# Patient Record
Sex: FEMALE | Race: WHITE | NOT HISPANIC OR LATINO | Employment: FULL TIME | URBAN - METROPOLITAN AREA
[De-identification: names, ages, dates, MRNs, and addresses within clinical notes are randomized per-mention and may not be internally consistent; named-entity substitution may affect disease eponyms.]

---

## 2017-02-16 ENCOUNTER — LAB REQUISITION (OUTPATIENT)
Dept: LAB | Facility: HOSPITAL | Age: 42
End: 2017-02-16

## 2017-02-16 DIAGNOSIS — K31.84 GASTROPARESIS: ICD-10-CM

## 2017-02-16 PROCEDURE — 88305 TISSUE EXAM BY PATHOLOGIST: CPT | Performed by: INTERNAL MEDICINE

## 2017-02-17 LAB
CYTO UR: NORMAL
LAB AP CASE REPORT: NORMAL
LAB AP CLINICAL INFORMATION: NORMAL
Lab: NORMAL
PATH REPORT.FINAL DX SPEC: NORMAL
PATH REPORT.GROSS SPEC: NORMAL

## 2018-12-19 ENCOUNTER — TELEPHONE (OUTPATIENT)
Dept: GASTROENTEROLOGY | Facility: CLINIC | Age: 43
End: 2018-12-19

## 2018-12-19 DIAGNOSIS — K31.84 GASTROPARESIS: Primary | ICD-10-CM

## 2018-12-19 RX ORDER — METOCLOPRAMIDE 10 MG/1
TABLET ORAL
Qty: 90 TABLET | Refills: 0 | Status: SHIPPED | OUTPATIENT
Start: 2018-12-19 | End: 2019-02-08 | Stop reason: SDUPTHER

## 2018-12-19 NOTE — TELEPHONE ENCOUNTER
Patient called because she is having terrible headaches with the domperidone. Per  sent in a month supply of Reglan and scheduled patient for a follow up

## 2019-02-08 DIAGNOSIS — K31.84 GASTROPARESIS: ICD-10-CM

## 2019-02-08 RX ORDER — METOCLOPRAMIDE 10 MG/1
TABLET ORAL
Qty: 33 TABLET | Refills: 0 | OUTPATIENT
Start: 2019-02-08 | End: 2020-02-05

## 2019-02-28 DIAGNOSIS — K31.84 GASTROPARESIS: ICD-10-CM

## 2019-03-01 ENCOUNTER — TELEPHONE (OUTPATIENT)
Dept: GASTROENTEROLOGY | Facility: CLINIC | Age: 44
End: 2019-03-01

## 2019-03-01 NOTE — TELEPHONE ENCOUNTER
"When the Pt and this nurse spoke on the phone she said she told tonia not to send the medication refill to us. I asked if she needed it. The Pt told me she really needed to find a different DrWaleska Because it is hard to get here when she lives in [place] and works in [place]. I told her I understand that because I see that you have \"no showed\" for your last two appointments and canceled 2 more. This nurse made her a visit on 3/26/19. This nurse informed her about [facility]'s cancellation and No show policy.    The Pt. Then told me that she didn't know what she would do since she needed the medication and would run out on Tuesday. I looked at the notes and on 12/19/18 and it said she could change from donparidon to Reglan for only one month and she needed to have a follow up visit. However she didn't keep any of them. I told her Dr. Ervin could refill the medication after she has an office due to the nonreversible side effects. Pt. Said she was recording our phone call and that I was telling her she would have to go the to ER if she wanted this medication. This nurse told her that she should have called us before she ran out of medication to avoid this situation. I asked her when she could come in. The Pt. Said she guesses she would have to go to the ER instead because she couldn't imagine what would happen to her if she didn't have this medication. This nurse did not want her to go to the ER so I super overbooked Dr. Ervin for 3/4/19 at 1:15pm.   "

## 2019-03-04 RX ORDER — METOCLOPRAMIDE 10 MG/1
TABLET ORAL
Qty: 33 TABLET | Refills: 0 | OUTPATIENT
Start: 2019-03-04

## 2019-10-24 ENCOUNTER — HOSPITAL ENCOUNTER (EMERGENCY)
Facility: HOSPITAL | Age: 44
Discharge: HOME | End: 2019-10-24
Attending: STUDENT IN AN ORGANIZED HEALTH CARE EDUCATION/TRAINING PROGRAM | Admitting: STUDENT IN AN ORGANIZED HEALTH CARE EDUCATION/TRAINING PROGRAM

## 2019-10-24 VITALS
TEMPERATURE: 98.5 F | WEIGHT: 213.8 LBS | BODY MASS INDEX: 32.4 KG/M2 | OXYGEN SATURATION: 98 % | HEIGHT: 68 IN | DIASTOLIC BLOOD PRESSURE: 95 MMHG | SYSTOLIC BLOOD PRESSURE: 130 MMHG | HEART RATE: 61 BPM | RESPIRATION RATE: 16 BRPM

## 2019-10-24 DIAGNOSIS — L50.8 URTICARIA, ACUTE: Primary | ICD-10-CM

## 2019-10-24 PROCEDURE — 25010000002 DIPHENHYDRAMINE PER 50 MG: Performed by: PHYSICIAN ASSISTANT

## 2019-10-24 PROCEDURE — 96374 THER/PROPH/DIAG INJ IV PUSH: CPT

## 2019-10-24 PROCEDURE — 96375 TX/PRO/DX INJ NEW DRUG ADDON: CPT

## 2019-10-24 PROCEDURE — 99282 EMERGENCY DEPT VISIT SF MDM: CPT

## 2019-10-24 PROCEDURE — 25010000002 METHYLPREDNISOLONE PER 40 MG: Performed by: PHYSICIAN ASSISTANT

## 2019-10-24 RX ORDER — FAMOTIDINE 20 MG/1
20 TABLET, FILM COATED ORAL NIGHTLY
Qty: 5 TABLET | Refills: 0 | OUTPATIENT
Start: 2019-10-24 | End: 2020-02-05

## 2019-10-24 RX ORDER — METHYLPREDNISOLONE SODIUM SUCCINATE 40 MG/ML
80 INJECTION, POWDER, LYOPHILIZED, FOR SOLUTION INTRAMUSCULAR; INTRAVENOUS ONCE
Status: COMPLETED | OUTPATIENT
Start: 2019-10-24 | End: 2019-10-24

## 2019-10-24 RX ORDER — HYDROXYZINE PAMOATE 25 MG/1
25 CAPSULE ORAL 3 TIMES DAILY PRN
Qty: 21 CAPSULE | Refills: 0 | OUTPATIENT
Start: 2019-10-24 | End: 2020-02-05

## 2019-10-24 RX ORDER — DIPHENHYDRAMINE HYDROCHLORIDE 50 MG/ML
25 INJECTION INTRAMUSCULAR; INTRAVENOUS ONCE
Status: COMPLETED | OUTPATIENT
Start: 2019-10-24 | End: 2019-10-24

## 2019-10-24 RX ORDER — PREDNISONE 20 MG/1
20 TABLET ORAL 2 TIMES DAILY
Qty: 8 TABLET | Refills: 0 | OUTPATIENT
Start: 2019-10-24 | End: 2020-02-05

## 2019-10-24 RX ORDER — SODIUM CHLORIDE 0.9 % (FLUSH) 0.9 %
10 SYRINGE (ML) INJECTION AS NEEDED
Status: DISCONTINUED | OUTPATIENT
Start: 2019-10-24 | End: 2019-10-24 | Stop reason: HOSPADM

## 2019-10-24 RX ORDER — FAMOTIDINE 10 MG/ML
20 INJECTION, SOLUTION INTRAVENOUS ONCE
Status: COMPLETED | OUTPATIENT
Start: 2019-10-24 | End: 2019-10-24

## 2019-10-24 RX ADMIN — FAMOTIDINE 20 MG: 10 INJECTION, SOLUTION INTRAVENOUS at 13:01

## 2019-10-24 RX ADMIN — SODIUM CHLORIDE 1000 ML: 9 INJECTION, SOLUTION INTRAVENOUS at 13:06

## 2019-10-24 RX ADMIN — METHYLPREDNISOLONE SODIUM SUCCINATE 80 MG: 40 INJECTION, POWDER, FOR SOLUTION INTRAMUSCULAR; INTRAVENOUS at 13:04

## 2019-10-24 RX ADMIN — DIPHENHYDRAMINE HYDROCHLORIDE 25 MG: 50 INJECTION, SOLUTION INTRAMUSCULAR; INTRAVENOUS at 12:58

## 2019-10-24 NOTE — ED PROVIDER NOTES
Subjective   Is here with complaint of some pruritic hives to her torso some to her thighs noted yesterday evening with some swelling to her lips associated no trouble swallowing no chest pain or shortness of air no fevers chills no nausea patient currently taking lisinopril and has taken Advil as well recently presents here for further evaluation        History provided by:  Patient      Review of Systems   Constitutional: Negative.  Negative for chills and fever.   HENT: Negative for drooling, sore throat and trouble swallowing.    Eyes: Negative.    Respiratory: Negative.    Cardiovascular: Negative.  Negative for leg swelling.   Gastrointestinal: Negative.    Genitourinary: Negative.    Musculoskeletal: Negative.    Skin: Positive for rash.   Neurological: Negative.    Psychiatric/Behavioral: The patient is nervous/anxious.    All other systems reviewed and are negative.      Past Medical History:   Diagnosis Date   • Anemia    • Gastroparesis    • GERD (gastroesophageal reflux disease)    • Hypertension        No Known Allergies    Past Surgical History:   Procedure Laterality Date   •  SECTION         History reviewed. No pertinent family history.    Social History     Socioeconomic History   • Marital status: Single     Spouse name: Not on file   • Number of children: Not on file   • Years of education: Not on file   • Highest education level: Not on file   Tobacco Use   • Smoking status: Never Smoker   • Smokeless tobacco: Never Used   Substance and Sexual Activity   • Alcohol use: Yes     Comment: Occ.   • Drug use: No   • Sexual activity: Defer           Objective   Physical Exam   Constitutional: She is oriented to person, place, and time. She appears well-developed and well-nourished.   Afebrile nontoxic no acute distress   HENT:   Head: Normocephalic.   Mouth/Throat: Oropharynx is clear and moist. No oropharyngeal exudate.   Posterior pharynx is clear uvula is midline no airway compromise  patient does have some slight edema to the lower lips   Eyes: Conjunctivae and EOM are normal. Pupils are equal, round, and reactive to light.   Neck: Normal range of motion. Neck supple. No tracheal deviation present.   Cardiovascular: Normal rate, regular rhythm and intact distal pulses.   Pulmonary/Chest: Effort normal and breath sounds normal.   Musculoskeletal: Normal range of motion. She exhibits no edema.   Lymphadenopathy:     She has no cervical adenopathy.   Neurological: She is alert and oriented to person, place, and time. No cranial nerve deficit or sensory deficit. She exhibits normal muscle tone. Coordination normal.   Skin: Skin is warm and dry. Capillary refill takes less than 2 seconds. Rash noted. There is erythema.   Some slight erythematous wheals.. Upper arms thighs   Psychiatric: Her behavior is normal. Judgment and thought content normal.   Somewhat anxious   Nursing note and vitals reviewed.      Procedures           ED Course  ED Course as of Oct 24 1415   Thu Oct 24, 2019   1232 Patient case and management reviewed with Dr. Cross  [SC]   1314 Patient resting no acute distress  [SC]   1409 She resting comfortably states she feels better she has dramatically decreased rash, decreased edema to her lower lip have discussed with her not taking any further lisinopril or ibuprofen medication  Follow-up with PCP... Return to the ER for any sudden changes worsening symptom concerns.. Agreeable with plan we will also continue prescribing some prednisone Vistaril and Pepcid for the next 2 days  [SC]      ED Course User Index  [SC] Joel Hilliard, DEANNA                  MDM  Number of Diagnoses or Management Options     Amount and/or Complexity of Data Reviewed  Review and summarize past medical records: yes  Discuss the patient with other providers: yes    Risk of Complications, Morbidity, and/or Mortality  Presenting problems: moderate  Diagnostic procedures: low  Management options:  low        Final diagnoses:   Urticaria, acute              Joel Hilliard PA-C  10/24/19 0170

## 2020-01-24 ENCOUNTER — TRANSCRIBE ORDERS (OUTPATIENT)
Dept: ADMINISTRATIVE | Facility: HOSPITAL | Age: 45
End: 2020-01-24

## 2020-01-24 DIAGNOSIS — Z12.31 BREAST CANCER SCREENING BY MAMMOGRAM: Primary | ICD-10-CM

## 2020-02-12 ENCOUNTER — TELEPHONE (OUTPATIENT)
Dept: URGENT CARE | Facility: CLINIC | Age: 45
End: 2020-02-12

## 2020-02-12 NOTE — TELEPHONE ENCOUNTER
Patient called and requesting diflucan since she has seemed to developed a yeast infection with her Tamiflu.  Thanks

## 2021-12-19 ENCOUNTER — TELEPHONE (OUTPATIENT)
Dept: URGENT CARE | Facility: CLINIC | Age: 46
End: 2021-12-19

## 2021-12-19 DIAGNOSIS — J20.9 ACUTE BRONCHITIS, UNSPECIFIED ORGANISM: ICD-10-CM

## 2021-12-19 RX ORDER — ALBUTEROL SULFATE 90 UG/1
2 AEROSOL, METERED RESPIRATORY (INHALATION) EVERY 4 HOURS PRN
Qty: 8 G | Refills: 0 | OUTPATIENT
Start: 2021-12-19 | End: 2021-12-31

## 2021-12-31 PROCEDURE — U0004 COV-19 TEST NON-CDC HGH THRU: HCPCS | Performed by: FAMILY MEDICINE

## 2022-03-30 ENCOUNTER — PRE-ADMISSION TESTING (OUTPATIENT)
Dept: PREADMISSION TESTING | Facility: HOSPITAL | Age: 47
End: 2022-03-30

## 2022-03-30 ENCOUNTER — PREP FOR SURGERY (OUTPATIENT)
Dept: OTHER | Facility: HOSPITAL | Age: 47
End: 2022-03-30

## 2022-03-30 ENCOUNTER — OFFICE VISIT (OUTPATIENT)
Dept: OBSTETRICS AND GYNECOLOGY | Facility: CLINIC | Age: 47
End: 2022-03-30

## 2022-03-30 VITALS
WEIGHT: 236 LBS | SYSTOLIC BLOOD PRESSURE: 128 MMHG | BODY MASS INDEX: 35.77 KG/M2 | HEIGHT: 68 IN | DIASTOLIC BLOOD PRESSURE: 80 MMHG

## 2022-03-30 DIAGNOSIS — N92.1 MENORRHAGIA WITH IRREGULAR CYCLE: ICD-10-CM

## 2022-03-30 DIAGNOSIS — R93.89 ENDOMETRIAL THICKENING ON ULTRASOUND: ICD-10-CM

## 2022-03-30 DIAGNOSIS — N93.9 ABNORMAL UTERINE BLEEDING (AUB): ICD-10-CM

## 2022-03-30 DIAGNOSIS — N93.9 ABNORMAL UTERINE BLEEDING (AUB): Primary | ICD-10-CM

## 2022-03-30 DIAGNOSIS — N85.2 BULKY OR ENLARGED UTERUS: ICD-10-CM

## 2022-03-30 DIAGNOSIS — D50.0 CHRONIC BLOOD LOSS ANEMIA: ICD-10-CM

## 2022-03-30 DIAGNOSIS — Z12.4 SCREENING FOR MALIGNANT NEOPLASM OF CERVIX: ICD-10-CM

## 2022-03-30 DIAGNOSIS — R93.89 THICKENED ENDOMETRIUM: ICD-10-CM

## 2022-03-30 LAB
ABO GROUP BLD: NORMAL
ALBUMIN SERPL-MCNC: 4.5 G/DL (ref 3.5–5.2)
ALBUMIN/GLOB SERPL: 1.7 G/DL
ALP SERPL-CCNC: 61 U/L (ref 39–117)
ALT SERPL W P-5'-P-CCNC: 44 U/L (ref 1–33)
ANION GAP SERPL CALCULATED.3IONS-SCNC: 12.8 MMOL/L (ref 5–15)
ANISOCYTOSIS BLD QL: NORMAL
AST SERPL-CCNC: 29 U/L (ref 1–32)
B-HCG UR QL: NEGATIVE
BASOPHILS # BLD AUTO: 0.04 10*3/MM3 (ref 0–0.2)
BASOPHILS NFR BLD AUTO: 0.3 % (ref 0–1.5)
BILIRUB SERPL-MCNC: 0.2 MG/DL (ref 0–1.2)
BILIRUB UR QL STRIP: NEGATIVE
BUN SERPL-MCNC: 11 MG/DL (ref 6–20)
BUN/CREAT SERPL: 15.3 (ref 7–25)
CALCIUM SPEC-SCNC: 9.1 MG/DL (ref 8.6–10.5)
CHLORIDE SERPL-SCNC: 99 MMOL/L (ref 98–107)
CLARITY UR: CLEAR
CO2 SERPL-SCNC: 23.2 MMOL/L (ref 22–29)
COLOR UR: YELLOW
CREAT SERPL-MCNC: 0.72 MG/DL (ref 0.57–1)
DEPRECATED RDW RBC AUTO: 46 FL (ref 37–54)
EGFRCR SERPLBLD CKD-EPI 2021: 103.9 ML/MIN/1.73
EOSINOPHIL # BLD AUTO: 0.01 10*3/MM3 (ref 0–0.4)
EOSINOPHIL NFR BLD AUTO: 0.1 % (ref 0.3–6.2)
ERYTHROCYTE [DISTWIDTH] IN BLOOD BY AUTOMATED COUNT: 16.9 % (ref 12.3–15.4)
GLOBULIN UR ELPH-MCNC: 2.6 GM/DL
GLUCOSE SERPL-MCNC: 131 MG/DL (ref 65–99)
GLUCOSE UR STRIP-MCNC: NEGATIVE MG/DL
HCT VFR BLD AUTO: 31.7 % (ref 34–46.6)
HGB BLD-MCNC: 9.4 G/DL (ref 12–15.9)
HGB UR QL STRIP.AUTO: NEGATIVE
HYPOCHROMIA BLD QL: NORMAL
IMM GRANULOCYTES # BLD AUTO: 0.11 10*3/MM3 (ref 0–0.05)
IMM GRANULOCYTES NFR BLD AUTO: 0.9 % (ref 0–0.5)
KETONES UR QL STRIP: NEGATIVE
LEUKOCYTE ESTERASE UR QL STRIP.AUTO: NEGATIVE
LYMPHOCYTES # BLD AUTO: 1.13 10*3/MM3 (ref 0.7–3.1)
LYMPHOCYTES NFR BLD AUTO: 9.5 % (ref 19.6–45.3)
MCH RBC QN AUTO: 22.7 PG (ref 26.6–33)
MCHC RBC AUTO-ENTMCNC: 29.7 G/DL (ref 31.5–35.7)
MCV RBC AUTO: 76.4 FL (ref 79–97)
MONOCYTES # BLD AUTO: 0.28 10*3/MM3 (ref 0.1–0.9)
MONOCYTES NFR BLD AUTO: 2.3 % (ref 5–12)
NEUTROPHILS NFR BLD AUTO: 10.35 10*3/MM3 (ref 1.7–7)
NEUTROPHILS NFR BLD AUTO: 86.9 % (ref 42.7–76)
NITRITE UR QL STRIP: NEGATIVE
NRBC BLD AUTO-RTO: 0 /100 WBC (ref 0–0.2)
PH UR STRIP.AUTO: 7.5 [PH] (ref 5–8)
PLATELET # BLD AUTO: 359 10*3/MM3 (ref 140–450)
PMV BLD AUTO: 9.3 FL (ref 6–12)
POTASSIUM SERPL-SCNC: 4.2 MMOL/L (ref 3.5–5.2)
PROT SERPL-MCNC: 7.1 G/DL (ref 6–8.5)
PROT UR QL STRIP: NEGATIVE
RBC # BLD AUTO: 4.15 10*6/MM3 (ref 3.77–5.28)
RH BLD: POSITIVE
SMALL PLATELETS BLD QL SMEAR: ADEQUATE
SODIUM SERPL-SCNC: 135 MMOL/L (ref 136–145)
SP GR UR STRIP: 1.01 (ref 1–1.03)
UROBILINOGEN UR QL STRIP: NORMAL
WBC MORPH BLD: NORMAL
WBC NRBC COR # BLD: 11.92 10*3/MM3 (ref 3.4–10.8)

## 2022-03-30 PROCEDURE — 85007 BL SMEAR W/DIFF WBC COUNT: CPT

## 2022-03-30 PROCEDURE — 93005 ELECTROCARDIOGRAM TRACING: CPT

## 2022-03-30 PROCEDURE — 86901 BLOOD TYPING SEROLOGIC RH(D): CPT

## 2022-03-30 PROCEDURE — 81025 URINE PREGNANCY TEST: CPT

## 2022-03-30 PROCEDURE — 81003 URINALYSIS AUTO W/O SCOPE: CPT

## 2022-03-30 PROCEDURE — 85025 COMPLETE CBC W/AUTO DIFF WBC: CPT

## 2022-03-30 PROCEDURE — 80053 COMPREHEN METABOLIC PANEL: CPT

## 2022-03-30 PROCEDURE — 93010 ELECTROCARDIOGRAM REPORT: CPT | Performed by: INTERNAL MEDICINE

## 2022-03-30 PROCEDURE — 36415 COLL VENOUS BLD VENIPUNCTURE: CPT

## 2022-03-30 PROCEDURE — 86900 BLOOD TYPING SEROLOGIC ABO: CPT

## 2022-03-30 PROCEDURE — 99204 OFFICE O/P NEW MOD 45 MIN: CPT | Performed by: OBSTETRICS & GYNECOLOGY

## 2022-03-30 RX ORDER — SODIUM CHLORIDE 0.9 % (FLUSH) 0.9 %
3 SYRINGE (ML) INJECTION EVERY 12 HOURS SCHEDULED
Status: CANCELLED | OUTPATIENT
Start: 2022-03-30

## 2022-03-30 RX ORDER — SODIUM CHLORIDE 0.9 % (FLUSH) 0.9 %
10 SYRINGE (ML) INJECTION AS NEEDED
Status: CANCELLED | OUTPATIENT
Start: 2022-03-30

## 2022-03-31 LAB
QT INTERVAL: 406 MS
QTC INTERVAL: 456 MS

## 2022-04-01 ENCOUNTER — ANESTHESIA EVENT (OUTPATIENT)
Dept: PERIOP | Facility: HOSPITAL | Age: 47
End: 2022-04-01

## 2022-04-01 ENCOUNTER — ANESTHESIA (OUTPATIENT)
Dept: PERIOP | Facility: HOSPITAL | Age: 47
End: 2022-04-01

## 2022-04-01 ENCOUNTER — HOSPITAL ENCOUNTER (OUTPATIENT)
Facility: HOSPITAL | Age: 47
Setting detail: HOSPITAL OUTPATIENT SURGERY
Discharge: HOME | End: 2022-04-01
Attending: OBSTETRICS & GYNECOLOGY | Admitting: OBSTETRICS & GYNECOLOGY

## 2022-04-01 VITALS
HEART RATE: 66 BPM | DIASTOLIC BLOOD PRESSURE: 69 MMHG | RESPIRATION RATE: 18 BRPM | TEMPERATURE: 97.8 F | OXYGEN SATURATION: 98 % | SYSTOLIC BLOOD PRESSURE: 135 MMHG

## 2022-04-01 DIAGNOSIS — N85.2 BULKY OR ENLARGED UTERUS: ICD-10-CM

## 2022-04-01 DIAGNOSIS — N92.1 MENORRHAGIA WITH IRREGULAR CYCLE: ICD-10-CM

## 2022-04-01 DIAGNOSIS — N93.9 ABNORMAL UTERINE BLEEDING (AUB): ICD-10-CM

## 2022-04-01 DIAGNOSIS — D50.0 CHRONIC BLOOD LOSS ANEMIA: ICD-10-CM

## 2022-04-01 DIAGNOSIS — R93.89 ENDOMETRIAL THICKENING ON ULTRASOUND: ICD-10-CM

## 2022-04-01 DIAGNOSIS — Z98.890 S/P D&C (STATUS POST DILATION AND CURETTAGE): Primary | ICD-10-CM

## 2022-04-01 DIAGNOSIS — Z97.5 IUD (INTRAUTERINE DEVICE) IN PLACE: ICD-10-CM

## 2022-04-01 LAB
ABO GROUP BLD: NORMAL
BLD GP AB SCN SERPL QL: NEGATIVE
RH BLD: POSITIVE
T&S EXPIRATION DATE: NORMAL

## 2022-04-01 PROCEDURE — S0260 H&P FOR SURGERY: HCPCS | Performed by: OBSTETRICS & GYNECOLOGY

## 2022-04-01 PROCEDURE — 58300 INSERT INTRAUTERINE DEVICE: CPT | Performed by: OBSTETRICS & GYNECOLOGY

## 2022-04-01 PROCEDURE — 86900 BLOOD TYPING SEROLOGIC ABO: CPT | Performed by: OBSTETRICS & GYNECOLOGY

## 2022-04-01 PROCEDURE — 86901 BLOOD TYPING SEROLOGIC RH(D): CPT | Performed by: OBSTETRICS & GYNECOLOGY

## 2022-04-01 PROCEDURE — 0 MEPERIDINE PER 100 MG: Performed by: NURSE ANESTHETIST, CERTIFIED REGISTERED

## 2022-04-01 PROCEDURE — C1889 IMPLANT/INSERT DEVICE, NOC: HCPCS | Performed by: OBSTETRICS & GYNECOLOGY

## 2022-04-01 PROCEDURE — 58558 HYSTEROSCOPY BIOPSY: CPT | Performed by: OBSTETRICS & GYNECOLOGY

## 2022-04-01 PROCEDURE — 86850 RBC ANTIBODY SCREEN: CPT | Performed by: OBSTETRICS & GYNECOLOGY

## 2022-04-01 PROCEDURE — 25010000002 ONDANSETRON PER 1 MG: Performed by: NURSE ANESTHETIST, CERTIFIED REGISTERED

## 2022-04-01 PROCEDURE — 25010000002 PROPOFOL 10 MG/ML EMULSION: Performed by: NURSE ANESTHETIST, CERTIFIED REGISTERED

## 2022-04-01 PROCEDURE — 25010000002 MIDAZOLAM PER 1 MG: Performed by: NURSE ANESTHETIST, CERTIFIED REGISTERED

## 2022-04-01 PROCEDURE — 25010000002 FENTANYL CITRATE (PF) 50 MCG/ML SOLUTION: Performed by: NURSE ANESTHETIST, CERTIFIED REGISTERED

## 2022-04-01 DEVICE — IUD LEVONORGESTREL MIRENA 52MG: Type: IMPLANTABLE DEVICE | Site: UTERUS | Status: FUNCTIONAL

## 2022-04-01 RX ORDER — PROPOFOL 10 MG/ML
VIAL (ML) INTRAVENOUS AS NEEDED
Status: DISCONTINUED | OUTPATIENT
Start: 2022-04-01 | End: 2022-04-01 | Stop reason: SURG

## 2022-04-01 RX ORDER — LIDOCAINE HYDROCHLORIDE 20 MG/ML
INJECTION, SOLUTION INTRAVENOUS AS NEEDED
Status: DISCONTINUED | OUTPATIENT
Start: 2022-04-01 | End: 2022-04-01 | Stop reason: SURG

## 2022-04-01 RX ORDER — IBUPROFEN 800 MG/1
800 TABLET ORAL EVERY 8 HOURS PRN
Qty: 30 TABLET | Refills: 0 | Status: SHIPPED | OUTPATIENT
Start: 2022-04-01

## 2022-04-01 RX ORDER — MEPERIDINE HYDROCHLORIDE 25 MG/ML
25 INJECTION INTRAMUSCULAR; INTRAVENOUS; SUBCUTANEOUS
Status: COMPLETED | OUTPATIENT
Start: 2022-04-01 | End: 2022-04-01

## 2022-04-01 RX ORDER — OXYCODONE HYDROCHLORIDE 5 MG/1
5 TABLET ORAL ONCE
Status: COMPLETED | OUTPATIENT
Start: 2022-04-01 | End: 2022-04-01

## 2022-04-01 RX ORDER — IBUPROFEN 600 MG/1
600 TABLET ORAL EVERY 6 HOURS PRN
Status: DISCONTINUED | OUTPATIENT
Start: 2022-04-01 | End: 2022-04-01 | Stop reason: HOSPADM

## 2022-04-01 RX ORDER — ACETAMINOPHEN 325 MG/1
TABLET ORAL AS NEEDED
Status: DISCONTINUED | OUTPATIENT
Start: 2022-04-01 | End: 2022-04-01 | Stop reason: SURG

## 2022-04-01 RX ORDER — ONDANSETRON 2 MG/ML
INJECTION INTRAMUSCULAR; INTRAVENOUS AS NEEDED
Status: DISCONTINUED | OUTPATIENT
Start: 2022-04-01 | End: 2022-04-01 | Stop reason: SURG

## 2022-04-01 RX ORDER — LIDOCAINE HYDROCHLORIDE AND EPINEPHRINE 10; 10 MG/ML; UG/ML
INJECTION, SOLUTION INFILTRATION; PERINEURAL AS NEEDED
Status: DISCONTINUED | OUTPATIENT
Start: 2022-04-01 | End: 2022-04-01 | Stop reason: HOSPADM

## 2022-04-01 RX ORDER — MIDAZOLAM HYDROCHLORIDE 5 MG/ML
INJECTION INTRAMUSCULAR; INTRAVENOUS AS NEEDED
Status: DISCONTINUED | OUTPATIENT
Start: 2022-04-01 | End: 2022-04-01 | Stop reason: SURG

## 2022-04-01 RX ORDER — OXYCODONE HYDROCHLORIDE 5 MG/1
5 TABLET ORAL EVERY 4 HOURS PRN
Qty: 5 TABLET | Refills: 0 | OUTPATIENT
Start: 2022-04-01 | End: 2022-08-22

## 2022-04-01 RX ORDER — ACETAMINOPHEN 500 MG
1000 TABLET ORAL EVERY 8 HOURS PRN
Qty: 60 TABLET | Refills: 0 | OUTPATIENT
Start: 2022-04-01 | End: 2022-08-22

## 2022-04-01 RX ORDER — SODIUM CHLORIDE 0.9 % (FLUSH) 0.9 %
10 SYRINGE (ML) INJECTION AS NEEDED
Status: DISCONTINUED | OUTPATIENT
Start: 2022-04-01 | End: 2022-04-01 | Stop reason: HOSPADM

## 2022-04-01 RX ORDER — SODIUM CHLORIDE 0.9 % (FLUSH) 0.9 %
3 SYRINGE (ML) INJECTION EVERY 12 HOURS SCHEDULED
Status: DISCONTINUED | OUTPATIENT
Start: 2022-04-01 | End: 2022-04-01 | Stop reason: HOSPADM

## 2022-04-01 RX ORDER — ACETAMINOPHEN 500 MG
1000 TABLET ORAL ONCE
Status: DISCONTINUED | OUTPATIENT
Start: 2022-04-01 | End: 2022-04-01 | Stop reason: HOSPADM

## 2022-04-01 RX ORDER — FENTANYL CITRATE 50 UG/ML
INJECTION, SOLUTION INTRAMUSCULAR; INTRAVENOUS AS NEEDED
Status: DISCONTINUED | OUTPATIENT
Start: 2022-04-01 | End: 2022-04-01 | Stop reason: SURG

## 2022-04-01 RX ORDER — SODIUM CHLORIDE, SODIUM LACTATE, POTASSIUM CHLORIDE, CALCIUM CHLORIDE 600; 310; 30; 20 MG/100ML; MG/100ML; MG/100ML; MG/100ML
1000 INJECTION, SOLUTION INTRAVENOUS CONTINUOUS
Status: DISCONTINUED | OUTPATIENT
Start: 2022-04-01 | End: 2022-04-01 | Stop reason: HOSPADM

## 2022-04-01 RX ORDER — SODIUM CHLORIDE 9 MG/ML
INJECTION, SOLUTION INTRAVENOUS CONTINUOUS PRN
Status: COMPLETED | OUTPATIENT
Start: 2022-04-01 | End: 2022-04-01

## 2022-04-01 RX ADMIN — PROPOFOL 200 MG: 10 INJECTION, EMULSION INTRAVENOUS at 12:12

## 2022-04-01 RX ADMIN — PROPOFOL 200 MG: 10 INJECTION, EMULSION INTRAVENOUS at 12:44

## 2022-04-01 RX ADMIN — MIDAZOLAM HYDROCHLORIDE 2 MG: 5 INJECTION, SOLUTION INTRAMUSCULAR; INTRAVENOUS at 12:11

## 2022-04-01 RX ADMIN — PROPOFOL 200 MG: 10 INJECTION, EMULSION INTRAVENOUS at 12:29

## 2022-04-01 RX ADMIN — FENTANYL CITRATE 100 MCG: 50 INJECTION INTRAMUSCULAR; INTRAVENOUS at 12:11

## 2022-04-01 RX ADMIN — ACETAMINOPHEN 1000 MG: 325 TABLET ORAL at 11:54

## 2022-04-01 RX ADMIN — PROPOFOL 200 MG: 10 INJECTION, EMULSION INTRAVENOUS at 12:24

## 2022-04-01 RX ADMIN — LIDOCAINE HYDROCHLORIDE 60 MG: 20 INJECTION, SOLUTION INTRAVENOUS at 12:12

## 2022-04-01 RX ADMIN — SODIUM CHLORIDE, POTASSIUM CHLORIDE, SODIUM LACTATE AND CALCIUM CHLORIDE 1000 ML: 600; 310; 30; 20 INJECTION, SOLUTION INTRAVENOUS at 11:17

## 2022-04-01 RX ADMIN — OXYCODONE 5 MG: 5 TABLET ORAL at 13:49

## 2022-04-01 RX ADMIN — PROPOFOL 200 MG: 10 INJECTION, EMULSION INTRAVENOUS at 12:37

## 2022-04-01 RX ADMIN — MEPERIDINE HYDROCHLORIDE 25 MG: 25 INJECTION, SOLUTION INTRAMUSCULAR; INTRAVENOUS; SUBCUTANEOUS at 13:28

## 2022-04-01 RX ADMIN — ONDANSETRON 4 MG: 2 INJECTION INTRAMUSCULAR; INTRAVENOUS at 12:12

## 2022-04-01 NOTE — OP NOTE
Erick Mayer  : 1975  MRN: [id number]  [facility]: [id number]  Date: 2022    Operative Report    Pre-op Diagnosis:  Abnormal uterine bleeding  Menorrhagia with irregular cycle  Chronic blood loss anemia  Enlarged fibroid uterus  Thickened endometrium on ultrasound  BMI 36   Post-op Diagnosis:  Same   Procedure: HYSTEROSCOPY  DILATION AND CURETTAGE  MIRENA IUD INSERTION (patient supplied device)   Surgeon:  Surgical assistant: JACKELIN Raymundo was responsible for performing the following activities: Retraction and Manipulation of hysteroscopic instruments and their skilled assistance was necessary for the success of this case.     OR Staff: Circulator: Bekah Dsouza RN  Scrub Person: Shaheed Bonner CSA; Elsa Ball CSA     Anesthesia: Sedation   Estimated Blood Loss: 20 mls   ABx: none   Specimens:  Endometrial curettings       Complications: None           Findings:   Normal external genitalia and vaginal vault. Normal-appearing cervix. Cervical canal was normal in appearance. Mildly enlarged anteverted uterus that sounded to 11 cm. Very thick endometrium. Both tubal ostia were visualized.     Description of Procedure:   Following confirmation of informed consent, the patient was taken to the operating room where her anesthesia was obtained without difficulty. She was prepped and draped in the usual sterile fashion in the dorsal lithotomy position. A surgical timeout for safety was performed and agreed upon by all team members. A heavy-weighted speculum and Farley retractor were placed into her vagina and the cervix was visualized. A paracervical block was performed using 1% lidocaine with epinephrine. A long Allis clamp was used to grasp the anterior lip of the cervix. Gentle traction was applied and the uterus was sounded to 11 cm using a uterine sound. The sound was removed and the cervix was gently dilated with sterile dilators to allow for entrance of a diagnostic  hysteroscope. The diagnostic hysteroscope was then gently advanced to the uterine fundus and the above findings were noted. Both ostia were visualized. The hysteroscope was then removed and a sharp curettage was performed using a non-serrated curette until a gritty texture was noted throughout.  A large amount of tissue was obtained.  All endometrial curettings were sent to pathology for review.  The Mirena IUD was then prepared and placed per the 's recommendations.  The strings were trimmed to 4 cm.  At the end of the procedure, excellent hemostasis was noted and all instruments were removed from the vagina. All counts were correct per the OR staff. The patient was awakened and taken to the recovery room in stable condition.    Дмитрий Carcamo MD  Obstetrics and Gynecology  [facility]

## 2022-04-01 NOTE — ANESTHESIA PREPROCEDURE EVALUATION
Anesthesia Evaluation     Patient summary reviewed and Nursing notes reviewed   no history of anesthetic complications:  NPO Solid Status: > 8 hours  NPO Liquid Status: > 8 hours           Airway   Mallampati: I  TM distance: >3 FB  Neck ROM: full  no difficulty expected  Dental - normal exam     Pulmonary - negative pulmonary ROS and normal exam   Cardiovascular - normal exam    (+) hypertension, dysrhythmias Tachycardia,       Neuro/Psych  (+) psychiatric history Anxiety,    GI/Hepatic/Renal/Endo    (+)  GERD,      Musculoskeletal (-) negative ROS    Abdominal    Substance History - negative use     OB/GYN negative ob/gyn ROS         Other - negative ROS                       Anesthesia Plan    ASA 2     MAC     intravenous induction     Anesthetic plan, all risks, benefits, and alternatives have been provided, discussed and informed consent has been obtained with: patient.        CODE STATUS:

## 2022-04-01 NOTE — ANESTHESIA POSTPROCEDURE EVALUATION
Patient: Sharri Mayer    Procedure Summary     Date: 04/01/22 Room / Location: [facility] OR 2 /  [facility]    Anesthesia Start: 1210 Anesthesia Stop: 1249    Procedure: HYSTEROSCOPY WITH DILATION AND CURETTAGE, MIRENA IUD INSERTIOn (N/A Vagina) Diagnosis:       Abnormal uterine bleeding (AUB)      Menorrhagia with irregular cycle      Bulky or enlarged uterus      Endometrial thickening on ultrasound      Chronic blood loss anemia      (Abnormal uterine bleeding (AUB) [N93.9])      (Menorrhagia with irregular cycle [N92.1])      (Bulky or enlarged uterus [N85.2])      (Endometrial thickening on ultrasound [R93.89])      (Chronic blood loss anemia [D50.0])    Surgeons: Дмитрий Carcamo MD Provider: Pedro Santana CRNA    Anesthesia Type: MAC ASA Status: 2          Anesthesia Type: MAC    Vitals  No vitals data found for the desired time range.    Vitals per nursing documentaion      Post Anesthesia Care and Evaluation    Patient location during evaluation: bedside  Patient participation: complete - patient participated  Level of consciousness: awake  Pain score: 0  Pain management: adequate  Airway patency: patent  Anesthetic complications: No anesthetic complications  PONV Status: controlled  Cardiovascular status: acceptable and stable  Respiratory status: acceptable and room air  Hydration status: acceptable

## 2022-04-01 NOTE — PROGRESS NOTES
GYN Office Visit    Subjective   Chief Complaint   Patient presents with   • Menstrual Problem     TVS done today, irregular bleeding for the last 2 months and having severe cramping.     Sharri Mayer is a 46 y.o. year old  presenting to be seen for heavy and irregular vaginal bleeding.    This patient reports bleeding for 2 months at this point.  She has daily bleeding that alternates between very heavy and light.  She has intense cramping with her bleeding, especially when passing large clots.  Her bleeding does occur monthly, but can last for 2 weeks at a time.  She changes maxipads multiple times during the same hour when bleeding is at its worst.  She does have chronic anemia.  No history of blood transfusion.  Previous tubal.    OB Hx:  x3, SAB x1  Pap smear: 2 years ago?  No history of cervical dysplasia  Mammogram: 3 years ago  Colonoscopy: 5 years ago  DEXA Scan: Never    Past Medical History:   Diagnosis Date   • Anemia    • Anxiety    • COVID-19 vaccine series completed    • Ear piercing    • Gastroparesis    • GERD (gastroesophageal reflux disease)    • H/O echocardiogram 2012    wnl   • Hypertension    • Seasonal allergies    • Tachycardia     sinus medication induced   • Tattoo    • Wears glasses        Past Surgical History:   Procedure Laterality Date   •  SECTION      x3   • CHOLECYSTECTOMY     • DILATION AND CURETTAGE, DIAGNOSTIC / THERAPEUTIC     • TUBAL ABDOMINAL LIGATION     • WISDOM TOOTH EXTRACTION      x4       History reviewed. No pertinent family history.     Social History     Tobacco Use   • Smoking status: Current Every Day Smoker     Packs/day: 0.25   • Smokeless tobacco: Never Used   Vaping Use   • Vaping Use: Never used   Substance Use Topics   • Alcohol use: Yes     Comment: Occ.   • Drug use: No       (Not in a hospital admission)      Patient has no known allergies.    Current Outpatient Medications on File Prior to Visit   Medication Sig Dispense  "Refill   • ALPRAZolam (XANAX) 1 MG tablet Take 1 mg by mouth 2 (Two) Times a Day.     • azithromycin (ZITHROMAX) 250 MG tablet Take 2 tablets the first day, then 1 tablet daily for 4 days. (Patient not taking: Reported on 3/30/2022) 6 tablet 0   • cetirizine (zyrTEC) 10 MG tablet Take 1 tablet by mouth Daily. 30 tablet 0   • fluconazole (Diflucan) 150 MG tablet 1 po q 3 days (Patient not taking: Reported on 3/30/2022) 3 tablet 1   • fluticasone (FLONASE) 50 MCG/ACT nasal spray 1-2 sprays each nostril daily (Patient taking differently: 2 sprays into the nostril(s) as directed by provider Daily. 1-2 sprays each nostril daily) 16 g 0   • lisinopril (PRINIVIL,ZESTRIL) 10 MG tablet Take 10 mg by mouth Daily.     • metoclopramide (REGLAN) 10 MG tablet Take 10 mg by mouth 2 (Two) Times a Day.     • omeprazole (priLOSEC) 20 MG capsule Take 20 mg by mouth Daily.     • predniSONE (DELTASONE) 20 MG tablet 3 po daily for 5 days, 2 po daily for 5 days, 1 po daily for 5 days (Patient taking differently: 3 po daily for 5 days, 2 po daily for 5 days, 1 po daily for 5 days- final dose 3/31/22) 30 tablet 0   • propranolol (INDERAL) 40 MG tablet Take 40 mg by mouth Daily.     • sertraline (ZOLOFT) 50 MG tablet Take 25 mg by mouth Daily.     • vitamin D (ERGOCALCIFEROL) 1.25 MG (17328 UT) capsule capsule Take 50,000 Units by mouth Every 7 (Seven) Days.     • [DISCONTINUED] budesonide-formoterol (SYMBICORT) 160-4.5 MCG/ACT inhaler Inhale 2 puffs 2 (Two) Times a Day. 1 inhaler 0     No current facility-administered medications on file prior to visit.       Social History    Tobacco Use      Smoking status: Current Every Day Smoker        Packs/day: 0.25      Smokeless tobacco: Never Used         Objective   /80   Ht 172.7 cm (68\")   Wt 107 kg (236 lb)   LMP 03/30/2022 Comment: period for the last two months  BMI 35.88 kg/m²     Physical Exam:  General Appearance: alert, pleasant, appears stated age, interactive and " cooperative  Breasts: Not performed.  Abdomen: no masses, no hepatomegaly, no splenomegaly, soft non-tender, no guarding and no rebound tenderness  Pelvis:  Pelvic: Clinical staff was present for exam  External genitalia:  normal appearance of the external genitalia including Bartholin's and Toftrees's glands.  :  urethral meatus normal;  Vagina:  normal pink mucosa without prolapse or lesions.  Cervix:  normal appearance.  Uterus:  normal size, shape and consistency.  Adnexa:  normal bimanual exam of the adnexa.  Rectal:  digital rectal exam not performed; anus visually normal appearing.         Medical Decision Making:    I reviewed her ultrasound from today.    Ultrasound:  Mildly enlarged, anteverted uterus with a 2.6 cm intramural fibroid noted.  The uterine length measures 11.4 cm.  The endometrium is significantly thickened measuring 22 mm.  Neither ovary is well visualized today, but no obvious adnexal masses are noted.  No free fluid in the cul-de-sac.    Assessment   Abnormal uterine bleeding  Menorrhagia with irregular cycle  Chronic blood loss anemia  Enlarged fibroid uterus  Thickened endometrium on ultrasound  BMI 36  Previous  x 3  Screening for malignancy of the cervix     Plan    No orders of the defined types were placed in this encounter.      Medication Management: Mirena IUD    Procedures Performed: Pap smear    We reviewed her symptoms, exam findings and imaging in detail today.  A Pap smear was collected.  We discussed my concern for likely adenomyosis.  Her uterus is mildly enlarged with a intramural mass that likely reflects a fibroid.  The endometrium is significantly thickened.  We discussed the need for endometrial sampling to rule out hyperplasia/malignancy.  We discussed further work-up options and treatment options today in detail.  Specifically, we discussed endometrial biopsy in the office, hysteroscopy, dilation & curettage, Mirena IUD, endometrial ablation and hysterectomy.   After reviewing the risks and benefits of each approach, the patient opted for hysteroscopy with possible Myosure, D&C and Mirena IUD insertion.  That procedure can be performed this week.  Risks for the procedure were reviewed today.  All questions answered.    Дмитрий Carcamo MD  Obstetrics and Gynecology  [facility]

## 2022-04-01 NOTE — ADDENDUM NOTE
Addendum  created 04/01/22 1304 by Pete Jackson, CRNA    Order list changed, Pharmacy for encounter modified

## 2022-04-01 NOTE — H&P
GYNECOLOGY HISTORY AND PHYSICAL    CHIEF COMPLAINT: Scheduled surgery    DIAGNOSIS:  Abnormal uterine bleeding  Menorrhagia with irregular cycle  Chronic blood loss anemia  Enlarged fibroid uterus  Thickened endometrium on ultrasound  BMI 36    ASSESSMENT/PLAN     46 y.o.  female who presents for scheduled hysteroscopy with possible Myosure, D&C and Mirena IUD insertion.    - Proceed to the OR for scheduled surgery  - Risks for the procedure reviewed  - SCDs for DVT ppx  - Antibiotics: NONE    HISTORY OF PRESENT ILLNESS     46 y.o.  female who presents for the scheduled procedure listed above.    She has no complaints today and there are no interval changes to the history.    REVIEW OF SYSTEMS: A complete review of systems was performed and was specifically negative for headache, changes in vision, RUQ pain, shortness of breath, chest pain, lower extremity edema and dysuria.     HISTORY:  Obstetrical History:  OB History    Para Term  AB Living   4 3 3   1 3   SAB IAB Ectopic Molar Multiple Live Births   1         3      # Outcome Date GA Lbr Ramiro/2nd Weight Sex Delivery Anes PTL Lv   4 SAB            3 Term            2 Term            1 Term                Past Medical History:  Past Medical History:   Diagnosis Date   • Anemia    • Anxiety    • COVID-19 vaccine series completed    • Ear piercing    • Gastroparesis    • GERD (gastroesophageal reflux disease)    • H/O echocardiogram 2012    wnl   • Hypertension    • Seasonal allergies    • Tachycardia     sinus medication induced   • Tattoo    • Wears glasses        Past Surgical History:  Past Surgical History:   Procedure Laterality Date   •  SECTION      x3   • CHOLECYSTECTOMY     • DILATION AND CURETTAGE, DIAGNOSTIC / THERAPEUTIC     • TUBAL ABDOMINAL LIGATION     • WISDOM TOOTH EXTRACTION      x4       Social History:  Social History     Tobacco Use   • Smoking status: Current Every Day Smoker     Packs/day: 0.25   •  Smokeless tobacco: Never Used   Vaping Use   • Vaping Use: Never used   Substance Use Topics   • Alcohol use: Yes     Comment: Occ.   • Drug use: No       Family History  No family history on file.     Allergies: No Known Allergies    OBJECTIVE   VITALS:  Temp:  [98.9 °F (37.2 °C)] 98.9 °F (37.2 °C)  Heart Rate:  [80] 80  Resp:  [16] 16  BP: (150)/(80) 150/80    PHYSICAL EXAM:  GENERAL: NAD, alert  CHEST: No increased work of breathing, CTAB  CV: RRR, WWP  ABDOMEN: Soft, NTTP  EXTREMITIES:  Warm and well-perfused, nontender, nonedematous  NEURO: AAO x 3, CN II-XII grossly intact    No current facility-administered medications on file prior to encounter.     Current Outpatient Medications on File Prior to Encounter   Medication Sig Dispense Refill   • ALPRAZolam (XANAX) 1 MG tablet Take 1 mg by mouth 2 (Two) Times a Day.     • azithromycin (ZITHROMAX) 250 MG tablet Take 2 tablets the first day, then 1 tablet daily for 4 days. (Patient not taking: Reported on 3/30/2022) 6 tablet 0   • cetirizine (zyrTEC) 10 MG tablet Take 1 tablet by mouth Daily. 30 tablet 0   • fluconazole (Diflucan) 150 MG tablet 1 po q 3 days (Patient not taking: Reported on 3/30/2022) 3 tablet 1   • fluticasone (FLONASE) 50 MCG/ACT nasal spray 1-2 sprays each nostril daily (Patient taking differently: 2 sprays into the nostril(s) as directed by provider Daily. 1-2 sprays each nostril daily) 16 g 0   • levonorgestrel (Mirena, 52 MG,) 20 MCG/24HR IUD Take to provider's office 1 each 0   • lisinopril (PRINIVIL,ZESTRIL) 10 MG tablet Take 10 mg by mouth Daily.     • metoclopramide (REGLAN) 10 MG tablet Take 10 mg by mouth 2 (Two) Times a Day.     • omeprazole (priLOSEC) 20 MG capsule Take 20 mg by mouth Daily.     • predniSONE (DELTASONE) 20 MG tablet 3 po daily for 5 days, 2 po daily for 5 days, 1 po daily for 5 days (Patient taking differently: 3 po daily for 5 days, 2 po daily for 5 days, 1 po daily for 5 days- final dose 3/31/22) 30 tablet 0   •  propranolol (INDERAL) 40 MG tablet Take 40 mg by mouth Daily.     • sertraline (ZOLOFT) 50 MG tablet Take 25 mg by mouth Daily.     • vitamin D (ERGOCALCIFEROL) 1.25 MG (87360 UT) capsule capsule Take 50,000 Units by mouth Every 7 (Seven) Days.     • [DISCONTINUED] budesonide-formoterol (SYMBICORT) 160-4.5 MCG/ACT inhaler Inhale 2 puffs 2 (Two) Times a Day. 1 inhaler 0       DIAGNOSTIC STUDIES:  Results from last 7 days   Lab Units 03/30/22  1406   WBC 10*3/mm3 11.92*   HEMOGLOBIN g/dL 9.4*   HEMATOCRIT % 31.7*   PLATELETS 10*3/mm3 359   SODIUM mmol/L 135*   POTASSIUM mmol/L 4.2   CHLORIDE mmol/L 99   CO2 mmol/L 23.2   BUN mg/dL 11   CREATININE mg/dL 0.72   GLUCOSE mg/dL 131*   CALCIUM mg/dL 9.1   AST (SGOT) U/L 29   ALT (SGPT) U/L 44*       No results found for this or any previous visit (from the past 24 hour(s)).    Дмитрий Carcamo MD  Obstetrics and Gynecology  [facility]

## 2022-04-01 NOTE — DISCHARGE INSTRUCTIONS
Pelvic Surgery  Home Care Instructions:  Dr. Дмитрий Carcamo      Thank you for choosing [facility]. Please let us know if you have questions or concerns or do not understand the information we give you. Always ask us to explain words or phrases you do not understand.    You have had pelvic surgery. Here are some basic guidelines to help you recover more quickly at home. Your doctor may give you more guidelines. If you have any questions after you go home, please call the numbers listed on the next page.    General Guidelines    1. You may resume normal daily activities.  2. Do not put anything in the vagina (no tampons or douching).    3.   Do not have sex until cleared by your physician.  4.   You may climb steps.  5. You may bathe or shower.  6. The only exercise you should do is walking. This is important for your recovery.  7. Do not drive while taking narcotics.  8. Take the medicines you were taking before surgery. Other medicines you need to take at home are:    Alternate Motrin and Tylenol around the clock. Take each every 8 hours in alternation so that you are getting one of the medications every 4 hours.   Roxicodone 5mg tab  - One tablet by mouth every 4-6 hours as needed for breakthrough pain   Metamucil + Colace daily to keep bowel movements soft        If you have questions about your medicines, let us know. Or, talk to your local pharmacist.    Surgery, lack of activity, pain medicines and iron pills tend to cause constipation. Take something every day to prevent constipation and straining.  Taking something like Metamucil® every day to increase fiber may prevent constipation. Follow the instructions on the container. If you need a gentle laxative, then something like Milk of Magnesia® or Correctol® work fairly well. You should not need to take laxatives often.    10. Call your doctor if you have:     a. Fever of 101 degrees or higher.  b. Heavy vaginal bleeding.  c. Worsening nausea  or pain.  d. Other problems or concern.    If you have any questions or concerns about your usual routines, please talk to the nurse or doctor.       To talk with your doctor at [facility], call:  Obst[facility]s [facility]necology Out[facility]  Phone: [phone]      We appreciate the opportunity you have given us to participate in your care.       -------------------------------------------------------------------------------------------------------------------------------------------------------------------------------------------------    No pushing, pulling, tugging,  heavy lifting, or strenuous activity.  No major decision making, driving, or drinking alcoholic beverages for 24 hours. ( due to the medications you have  received)  Always use good hand hygiene/washing techniques.  NO driving while taking pain medications.    * if you have an incision:  Check your incision area every day for signs of infection.   Check for:  * more redness, swelling, or pain  *more fluid or blood  *warmth  *pus or bad smell.      To assist you in voiding:  Drink plenty of fluids  Listen to running water while attempting to void.    If you are unable to urinate and you have an uncomfortable urge to void or it has been   6 hours since you were discharged, return to the Emergency Room.      Pelvic rest is best described as not putting anything in your vagina. This includes tampons, douching, tub bathing or sexual activity.

## 2022-04-06 ENCOUNTER — OFFICE VISIT (OUTPATIENT)
Dept: OBSTETRICS AND GYNECOLOGY | Facility: CLINIC | Age: 47
End: 2022-04-06

## 2022-04-06 VITALS
DIASTOLIC BLOOD PRESSURE: 80 MMHG | SYSTOLIC BLOOD PRESSURE: 134 MMHG | WEIGHT: 240 LBS | BODY MASS INDEX: 36.37 KG/M2 | HEIGHT: 68 IN

## 2022-04-06 DIAGNOSIS — N93.9 ABNORMAL UTERINE BLEEDING (AUB): Primary | ICD-10-CM

## 2022-04-06 DIAGNOSIS — N85.2 BULKY OR ENLARGED UTERUS: ICD-10-CM

## 2022-04-06 DIAGNOSIS — Z97.5 IUD (INTRAUTERINE DEVICE) IN PLACE: ICD-10-CM

## 2022-04-06 DIAGNOSIS — D50.0 CHRONIC BLOOD LOSS ANEMIA: ICD-10-CM

## 2022-04-06 LAB
LAB AP CASE REPORT: NORMAL
PATH REPORT.FINAL DX SPEC: NORMAL

## 2022-04-06 PROCEDURE — 99212 OFFICE O/P EST SF 10 MIN: CPT | Performed by: OBSTETRICS & GYNECOLOGY

## 2022-04-06 RX ORDER — FERROUS SULFATE 325(65) MG
325 TABLET ORAL
Qty: 60 TABLET | Refills: 6 | OUTPATIENT
Start: 2022-04-06 | End: 2022-08-22

## 2022-04-12 ENCOUNTER — TELEPHONE (OUTPATIENT)
Dept: OBSTETRICS AND GYNECOLOGY | Facility: CLINIC | Age: 47
End: 2022-04-12

## 2022-04-12 PROBLEM — R93.89 ENDOMETRIAL THICKENING ON ULTRASOUND: Status: RESOLVED | Noted: 2022-03-30 | Resolved: 2022-04-12

## 2022-04-12 NOTE — TELEPHONE ENCOUNTER
----- Message from Miguelina Amor MA sent at 4/12/2022  3:41 PM EDT -----  Patient is requesting RX for yeast infection. Symptoms: Vaginal itching , and swelling for 2 days.    [facility]     Thank You      [facility]

## 2022-04-12 NOTE — PROGRESS NOTES
"Postoperative Assessment Visit    Subjective   Chief Complaint   Patient presents with   • Post-op     5 days post op D&C hysteroscopy, mirena insertion. C/O spotting       46 y.o.  female who presents for a post-op visit.    Pre-op Diagnosis:  Abnormal uterine bleeding  Menorrhagia with irregular cycle  Chronic blood loss anemia  Enlarged fibroid uterus  Thickened endometrium on ultrasound  BMI 36   Post-op Diagnosis:  Same   Procedure: HYSTEROSCOPY  DILATION AND CURETTAGE  MIRENA IUD INSERTION (patient supplied device)     The patient is doing well with no major issues. Her last Hgb was 9.4.     Objective   Vitals:    22 1303   BP: 134/80   Weight: 109 kg (240 lb)   Height: 172.7 cm (68\")     Physical Exam:  Reassuring postoperative exam       Medical Decision Making:    I have reviewed the operative note.  I have reviewed the postoperative labs where appropriate.    Assessment   AUB, anemia s/p hysteroscopy/D&C/IUD insertion  Post-op evaluation     Plan    No orders of the defined types were placed in this encounter.      Medication Management: none    Patient is meeting all post-op milestones.  Pathology reviewed - benign endometrium  Surgical findings discussed.    RTC for IUD string check in 1 month.    Дмитрий Carcamo MD  Obstetrics and Gynecology  [facility]  "

## 2022-04-13 DIAGNOSIS — B37.31 VULVOVAGINAL CANDIDIASIS: Primary | ICD-10-CM

## 2022-04-13 RX ORDER — FLUCONAZOLE 150 MG/1
TABLET ORAL
Qty: 2 TABLET | Refills: 0 | OUTPATIENT
Start: 2022-04-13 | End: 2022-08-22

## 2022-04-14 DIAGNOSIS — N93.9 ABNORMAL UTERINE BLEEDING (AUB): ICD-10-CM

## 2022-04-14 DIAGNOSIS — N85.2 BULKY OR ENLARGED UTERUS: ICD-10-CM

## 2022-04-14 DIAGNOSIS — N92.1 MENORRHAGIA WITH IRREGULAR CYCLE: ICD-10-CM

## 2022-04-14 DIAGNOSIS — R93.89 THICKENED ENDOMETRIUM: ICD-10-CM

## 2022-04-14 DIAGNOSIS — Z12.4 SCREENING FOR MALIGNANT NEOPLASM OF CERVIX: ICD-10-CM

## 2022-04-14 DIAGNOSIS — D50.0 CHRONIC BLOOD LOSS ANEMIA: ICD-10-CM

## 2022-08-22 PROCEDURE — U0004 COV-19 TEST NON-CDC HGH THRU: HCPCS | Performed by: NURSE PRACTITIONER

## 2022-12-05 ENCOUNTER — TELEPHONE (OUTPATIENT)
Dept: URGENT CARE | Facility: CLINIC | Age: 47
End: 2022-12-05

## 2022-12-05 DIAGNOSIS — B37.9 ANTIBIOTIC-INDUCED YEAST INFECTION: Primary | ICD-10-CM

## 2022-12-05 DIAGNOSIS — T36.95XA ANTIBIOTIC-INDUCED YEAST INFECTION: Primary | ICD-10-CM

## 2022-12-05 RX ORDER — FLUCONAZOLE 150 MG/1
TABLET ORAL
Qty: 2 TABLET | Refills: 0 | Status: SHIPPED | OUTPATIENT
Start: 2022-12-05

## 2022-12-05 NOTE — TELEPHONE ENCOUNTER
Patient recently prescribed z pack, now is experiencing vaginal irritation. Patient is requesting Diflucan sent to pharmacy.    [facility] pharmacy.

## 2023-10-15 ENCOUNTER — TELEPHONE (OUTPATIENT)
Dept: URGENT CARE | Facility: CLINIC | Age: 48
End: 2023-10-15
Payer: MEDICAID

## 2023-10-15 DIAGNOSIS — T36.95XA ANTIBIOTIC-INDUCED YEAST INFECTION: Primary | ICD-10-CM

## 2023-10-15 DIAGNOSIS — B37.9 ANTIBIOTIC-INDUCED YEAST INFECTION: Primary | ICD-10-CM

## 2023-10-15 RX ORDER — FLUCONAZOLE 150 MG/1
150 TABLET ORAL ONCE
Qty: 1 TABLET | Refills: 0 | Status: SHIPPED | OUTPATIENT
Start: 2023-10-15 | End: 2023-10-15

## 2023-10-15 NOTE — TELEPHONE ENCOUNTER
PATIENT WAS SEEN LAST WEEK 10/10/23- WE CALLED HER IN ANTIBIOTICS BUT SHE NOW HAS A YEAST INFECTION AND WONDERED IF WE COULD CALL IN SOME DIFLUCAN FOR HER.     [facility]  [address], [place] [address] [address] ~[address]  [phone]    PLEASE ADVISE

## 2024-03-07 ENCOUNTER — APPOINTMENT (OUTPATIENT)
Dept: GENERAL RADIOLOGY | Facility: HOSPITAL | Age: 49
End: 2024-03-07
Payer: MEDICAID

## 2024-03-07 ENCOUNTER — HOSPITAL ENCOUNTER (EMERGENCY)
Facility: HOSPITAL | Age: 49
Discharge: HOME | End: 2024-03-07
Attending: EMERGENCY MEDICINE
Payer: MEDICAID

## 2024-03-07 VITALS
HEIGHT: 68 IN | TEMPERATURE: 97.9 F | HEART RATE: 69 BPM | OXYGEN SATURATION: 96 % | SYSTOLIC BLOOD PRESSURE: 163 MMHG | DIASTOLIC BLOOD PRESSURE: 99 MMHG | WEIGHT: 220 LBS | BODY MASS INDEX: 33.34 KG/M2 | RESPIRATION RATE: 18 BRPM

## 2024-03-07 DIAGNOSIS — I16.0 HYPERTENSIVE URGENCY: Primary | ICD-10-CM

## 2024-03-07 DIAGNOSIS — F41.9 ANXIETY: ICD-10-CM

## 2024-03-07 LAB
ALBUMIN SERPL-MCNC: 4.5 G/DL (ref 3.5–5.2)
ALBUMIN/GLOB SERPL: 1.8 G/DL
ALP SERPL-CCNC: 61 U/L (ref 39–117)
ALT SERPL W P-5'-P-CCNC: 38 U/L (ref 1–33)
ANION GAP SERPL CALCULATED.3IONS-SCNC: 12.3 MMOL/L (ref 5–15)
AST SERPL-CCNC: 35 U/L (ref 1–32)
BASOPHILS # BLD AUTO: 0.05 10*3/MM3 (ref 0–0.2)
BASOPHILS NFR BLD AUTO: 0.6 % (ref 0–1.5)
BILIRUB SERPL-MCNC: 0.4 MG/DL (ref 0–1.2)
BUN SERPL-MCNC: 10 MG/DL (ref 6–20)
BUN/CREAT SERPL: 13.2 (ref 7–25)
CALCIUM SPEC-SCNC: 9.1 MG/DL (ref 8.6–10.5)
CHLORIDE SERPL-SCNC: 103 MMOL/L (ref 98–107)
CO2 SERPL-SCNC: 25.7 MMOL/L (ref 22–29)
CREAT SERPL-MCNC: 0.76 MG/DL (ref 0.57–1)
DEPRECATED RDW RBC AUTO: 40.4 FL (ref 37–54)
EGFRCR SERPLBLD CKD-EPI 2021: 96.2 ML/MIN/1.73
EOSINOPHIL # BLD AUTO: 0.11 10*3/MM3 (ref 0–0.4)
EOSINOPHIL NFR BLD AUTO: 1.3 % (ref 0.3–6.2)
ERYTHROCYTE [DISTWIDTH] IN BLOOD BY AUTOMATED COUNT: 12.3 % (ref 12.3–15.4)
GLOBULIN UR ELPH-MCNC: 2.5 GM/DL
GLUCOSE SERPL-MCNC: 114 MG/DL (ref 65–99)
HCT VFR BLD AUTO: 43.6 % (ref 34–46.6)
HGB BLD-MCNC: 15.4 G/DL (ref 12–15.9)
IMM GRANULOCYTES # BLD AUTO: 0.04 10*3/MM3 (ref 0–0.05)
IMM GRANULOCYTES NFR BLD AUTO: 0.5 % (ref 0–0.5)
LYMPHOCYTES # BLD AUTO: 1.99 10*3/MM3 (ref 0.7–3.1)
LYMPHOCYTES NFR BLD AUTO: 23.6 % (ref 19.6–45.3)
MCH RBC QN AUTO: 32.1 PG (ref 26.6–33)
MCHC RBC AUTO-ENTMCNC: 35.3 G/DL (ref 31.5–35.7)
MCV RBC AUTO: 90.8 FL (ref 79–97)
MONOCYTES # BLD AUTO: 0.7 10*3/MM3 (ref 0.1–0.9)
MONOCYTES NFR BLD AUTO: 8.3 % (ref 5–12)
NEUTROPHILS NFR BLD AUTO: 5.56 10*3/MM3 (ref 1.7–7)
NEUTROPHILS NFR BLD AUTO: 65.7 % (ref 42.7–76)
NRBC BLD AUTO-RTO: 0 /100 WBC (ref 0–0.2)
PLATELET # BLD AUTO: 274 10*3/MM3 (ref 140–450)
PMV BLD AUTO: 9.4 FL (ref 6–12)
POTASSIUM SERPL-SCNC: 3.7 MMOL/L (ref 3.5–5.2)
PROT SERPL-MCNC: 7 G/DL (ref 6–8.5)
RBC # BLD AUTO: 4.8 10*6/MM3 (ref 3.77–5.28)
SODIUM SERPL-SCNC: 141 MMOL/L (ref 136–145)
WBC NRBC COR # BLD AUTO: 8.45 10*3/MM3 (ref 3.4–10.8)

## 2024-03-07 PROCEDURE — 25010000002 DIAZEPAM PER 5 MG: Performed by: EMERGENCY MEDICINE

## 2024-03-07 PROCEDURE — 71045 X-RAY EXAM CHEST 1 VIEW: CPT

## 2024-03-07 PROCEDURE — 96374 THER/PROPH/DIAG INJ IV PUSH: CPT

## 2024-03-07 PROCEDURE — 93005 ELECTROCARDIOGRAM TRACING: CPT | Performed by: EMERGENCY MEDICINE

## 2024-03-07 PROCEDURE — 80053 COMPREHEN METABOLIC PANEL: CPT | Performed by: EMERGENCY MEDICINE

## 2024-03-07 PROCEDURE — 25010000002 LABETALOL 5 MG/ML SOLUTION: Performed by: EMERGENCY MEDICINE

## 2024-03-07 PROCEDURE — 99284 EMERGENCY DEPT VISIT MOD MDM: CPT

## 2024-03-07 PROCEDURE — 96375 TX/PRO/DX INJ NEW DRUG ADDON: CPT

## 2024-03-07 PROCEDURE — 85025 COMPLETE CBC W/AUTO DIFF WBC: CPT | Performed by: EMERGENCY MEDICINE

## 2024-03-07 RX ORDER — DIAZEPAM 5 MG/ML
5 INJECTION, SOLUTION INTRAMUSCULAR; INTRAVENOUS ONCE
Status: COMPLETED | OUTPATIENT
Start: 2024-03-07 | End: 2024-03-07

## 2024-03-07 RX ORDER — CLONIDINE HYDROCHLORIDE 0.1 MG/1
0.1 TABLET ORAL 2 TIMES DAILY PRN
Qty: 20 TABLET | Refills: 0 | Status: SHIPPED | OUTPATIENT
Start: 2024-03-07 | End: 2024-03-17

## 2024-03-07 RX ORDER — LABETALOL HYDROCHLORIDE 5 MG/ML
20 INJECTION, SOLUTION INTRAVENOUS ONCE
Status: COMPLETED | OUTPATIENT
Start: 2024-03-07 | End: 2024-03-07

## 2024-03-07 RX ADMIN — DIAZEPAM 5 MG: 10 INJECTION, SOLUTION INTRAMUSCULAR; INTRAVENOUS at 17:47

## 2024-03-07 RX ADMIN — LABETALOL HYDROCHLORIDE 20 MG: 5 INJECTION, SOLUTION INTRAVENOUS at 19:04

## 2024-03-07 NOTE — ED PROVIDER NOTES
Subjective   History of Present Illness  Is a 48-year-old female with past medical history of anxiety and hypertension presented to the emergency department with some elevated blood pressure.  The patient states that she has had some elevated blood pressure before in the past.  She is been compliant with her medications.  She went to her primary physician today and they checked her blood pressure.  Found to be significantly elevated.  She started getting very anxious about this.  She brought her blood pressure cuff at home and was checking her blood pressure and was found to be elevated.  Patient is having some dull, mild headache.  Located in the back of her head.  Nonradiating.  Not associate with any change in vision or focal weakness.  No neck pain.  Denies any chest pain or shortness of breath.  No abdominal pain or vomiting.    History provided by:  Patient   used: No        Review of Systems   Constitutional:  Negative for chills and fever.   HENT:  Negative for congestion, ear pain and sore throat.    Eyes:  Negative for visual disturbance.   Respiratory:  Negative for shortness of breath.    Cardiovascular:  Negative for chest pain.   Gastrointestinal:  Negative for abdominal pain.   Genitourinary:  Negative for difficulty urinating.   Musculoskeletal:  Negative for arthralgias.   Skin:  Negative for rash.   Neurological:  Positive for headaches. Negative for dizziness, weakness and numbness.   Psychiatric/Behavioral:  Negative for agitation.        Past Medical History:   Diagnosis Date    Anemia     Anxiety     COVID-19 vaccine series completed     Ear piercing     Gastroparesis     GERD (gastroesophageal reflux disease)     H/O echocardiogram 2012    wnl    Hypertension     Seasonal allergies     Tachycardia     sinus medication induced    Tattoo     Wears glasses        No Known Allergies    Past Surgical History:   Procedure Laterality Date     SECTION      x3     CHOLECYSTECTOMY      DILATION AND CURETTAGE, DIAGNOSTIC / THERAPEUTIC      HYSTEROSCOPY N/A 2022    Procedure: HYSTEROSCOPY WITH DILATION AND CURETTAGE, MIRENA IUD INSERTIOn;  Surgeon: Дмитрий Carcamo MD;  Location: [facility];  Service: Obstetrics/Gynecology;  Laterality: N/A;    TUBAL ABDOMINAL LIGATION      WISDOM TOOTH EXTRACTION      x4       No family history on file.    Social History     Socioeconomic History    Marital status: Single   Tobacco Use    Smoking status: Former     Current packs/day: 0.00     Types: Cigarettes     Quit date: 2021     Years since quittin.2    Smokeless tobacco: Never   Vaping Use    Vaping status: Never Used   Substance and Sexual Activity    Alcohol use: Yes     Comment: Occ.    Drug use: No    Sexual activity: Defer           Objective   Physical Exam  Vitals and nursing note reviewed.   Constitutional:       General: She is not in acute distress.     Appearance: She is not ill-appearing or toxic-appearing.   HENT:      Mouth/Throat:      Pharynx: No posterior oropharyngeal erythema.   Eyes:      Conjunctiva/sclera: Conjunctivae normal.      Pupils: Pupils are equal, round, and reactive to light.   Cardiovascular:      Rate and Rhythm: Normal rate and regular rhythm.   Pulmonary:      Effort: Pulmonary effort is normal. No respiratory distress.   Abdominal:      General: Abdomen is flat. There is no distension.      Palpations: There is no mass.      Tenderness: There is no abdominal tenderness. There is no guarding or rebound.   Musculoskeletal:         General: No deformity. Normal range of motion.   Skin:     General: Skin is warm.      Findings: No rash.   Neurological:      General: No focal deficit present.      Mental Status: She is alert and oriented to person, place, and time.      Motor: No weakness.         ECG 12 Lead QT Measurement      Date/Time: 3/7/2024 5:53 PM    Performed by: Adonay Kraft MD  Authorized by: Adonay Kraft MD   Previous ECG: no previous ECG available  Rhythm: sinus rhythm  Ectopy: PVCs  Rate: normal  BPM: 71  QRS axis: normal  Conduction: conduction normal  Other findings comments: Nonspecific ST changes  Clinical impression: non-specific ECG  Comments: Interpretation:  EKG was directly visualized by myself, interpretations as documented in hospital course.               ED Course  ED Course as of 03/07/24 2008   Thu Mar 07, 2024   1755 BP(!): 233/130 [JK]   1755 Temp: 97.9 °F (36.6 °C) [JK]   1755 Temp src: Oral [JK]   1755 Heart Rate: 79 [JK]   1755 Resp: 18 [JK]   1755 SpO2: 98 %  Interpretation:  Patient's repeat vitals, telemetry tracing, and pulse oximetry tracing were directly viewed and interpreted by myself.  Normal sinus rhythm [JK]   2006 CBC & Differential [JK]   2006 Comprehensive Metabolic Panel(!)  Interpretation:  Laboratory studies were reviewed and interpreted directly by myself.  CBC normal, CMP unremarkable [JK]   2006 BP: 163/99  Repeat telemetry remains normal, blood pressure is improved. [JK]   2006 On reevaluation, patient is feeling much better.  Symptoms consistent with acute hypertensive urgency.  Exacerbated by anxiety.  Patient will continue to take her blood pressure medications at home.  We did give her instructions on how to appropriately take and chart her blood pressure.  She is to follow-up with her PCP in 48 hours.  Given strict return precautions.  Verbalized understanding. [JK]   2007 I had a discussion with the patient/family regarding diagnosis, diagnostic results, treatment plan, and medications. The patient/family indicated understanding of these instructions. I spent adequate time at the bedside prior to discharge necessary to discuss the aftercare instructions, giving patient education, providing explanations of the results of our evaluations/findings, and my decision making to assure that the patient/family understand the plan of care. Time was allotted to answer questions at  that time and throughout the ED course. Patient is required to maintain timely follow up, as discussed. I also discussed the potential for the development of an acute emergent condition requiring further evaluation, return to the ER, admission, or even surgical intervention.  I encouraged the patient to return to the emergency department immediately for any concerns, worsening symptoms, new complaints, or if symptoms persist and they are unable to seek follow-up in a timely fashion. The patient/family expressed understanding and agreement with this plan    Shared decision making:   After full review of the patient's clinical presentation, review of any work-up including but not limited to laboratory studies and radiology obtained, I had a discussion with the patient.  Treatment options were discussed as well as the risks, benefits and consequences.  I discussed all findings with the patient and family members if available.  During the discussion, treatment goals were understood by all as well as any misconceptions which were addressed with the patient.  Ample time was given for any questions they may have had.  They are in agreement with the treatment plan as well as final disposition. [JK]      ED Course User Index  [JK] Adonay Kraft MD                                             Medical Decision Making  This is a 48-year-old female with a history of anxiety hypertension presenting with some elevated blood pressure.  Patient symptoms seem consistent with hypertensive urgency.  I do believe this is likely compounded by her anxiety.  Patient's neuroexam appears normal at this time.  Telemetry tracing appropriate.  IV access will be established and the patient.  She was provided anxiety lytics.  Patient be placed on continuous telemetry and pulse oximeter monitoring.  Workup initiated.      Differential diagnosis: Hypertensive urgency, primary hypertension, acute kidney injury, electrolyte abnormality,  anemia      Amount and/or Complexity of Data Reviewed  External Data Reviewed: labs, radiology and notes.     Details: External laboratories, imaging as well as notes were reviewed personally by myself.  All relevant studies were used to guide decision making.     Date of previous record: 4/1/2022    Source of note: Admission record    Summary: Patient was admitted for blood loss anemia secondary to vaginal bleeding.  I did review basic laboratory studies on file as well as a previous chest x-ray.  Records reviewed    Labs: ordered. Decision-making details documented in ED Course.  Radiology: ordered and independent interpretation performed. Decision-making details documented in ED Course.  ECG/medicine tests: ordered and independent interpretation performed. Decision-making details documented in ED Course.    Risk  Prescription drug management.        Final diagnoses:   Hypertensive urgency   Anxiety       ED Disposition  ED Disposition       ED Disposition   Discharge    Condition   Stable    Comment   --               Brian Mayberry MD  [address] [place] [address]   [place] [id number] [phone]    Call in 1 day           Medication List        New Prescriptions      cloNIDine 0.1 MG tablet  Commonly known as: CATAPRES  Take 1 tablet by mouth 2 (Two) Times a Day As Needed for High Blood Pressure (SBP > 180) for up to 10 days.               Where to Get Your Medications        These medications were sent to [id number] - [place], [place] - [address] AT [facility] [phone] [facility] [phone]   [address] [facility] [address]      Phone: [phone]   cloNIDine 0.1 MG tablet            Adonay Kraft MD  03/07/24 2008

## 2025-08-04 ENCOUNTER — TELEPHONE (OUTPATIENT)
Age: 50
End: 2025-08-04

## 2025-08-04 RX ORDER — CHLORTHALIDONE 25 MG/1
25 TABLET ORAL DAILY
Qty: 1 TABLET | Refills: 1 | OUTPATIENT
Start: 2025-08-04

## (undated) DEVICE — RICH MINOR LITHOTOMY: Brand: MEDLINE INDUSTRIES, INC.

## (undated) DEVICE — SOL SCRUB STAT ENDURE 420 CIDASTAT 2PCT FM 4OZ

## (undated) DEVICE — GLV SURG BIOGEL PI ULTRATOUCH G SZ7.5 LF

## (undated) DEVICE — SOL IRR H2O BTL 1000ML STRL

## (undated) DEVICE — SEAL HYSTERSCOPE/OUTFLOW CHANNEL MYOSURE

## (undated) DEVICE — ADHESIVE ISLAND DRESSING: Brand: TELFA

## (undated) DEVICE — DRAPE UNDERBUTTOCKS W FLUID POUCH 40X44IN

## (undated) DEVICE — ST IRR CYSTO W/SPK 77IN LF

## (undated) DEVICE — MARKR SKIN W/RULR

## (undated) DEVICE — TBG CONN OUTFLOW AQUILEX/MYOSURE

## (undated) DEVICE — SLV SCD CALF HEMOFORCE DVT THERP REPROC MD

## (undated) DEVICE — TBG CONN INFLOW AQUILEX/MYOSURE

## (undated) DEVICE — GLV SURG SENSICARE PI LF PF 7.5 GRN STRL

## (undated) DEVICE — TUBING, SUCTION, 1/4" X 12', STRAIGHT: Brand: MEDLINE

## (undated) DEVICE — PREMIUM WET SKIN PREP TRAY CHG: Brand: MEDLINE INDUSTRIES, INC.